# Patient Record
Sex: FEMALE | Race: WHITE | HISPANIC OR LATINO | URBAN - METROPOLITAN AREA
[De-identification: names, ages, dates, MRNs, and addresses within clinical notes are randomized per-mention and may not be internally consistent; named-entity substitution may affect disease eponyms.]

---

## 2020-06-02 ENCOUNTER — HOSPITAL ENCOUNTER (EMERGENCY)
Facility: HOSPITAL | Age: 35
Discharge: HOME | End: 2020-06-02
Attending: EMERGENCY MEDICINE

## 2020-06-02 VITALS
HEIGHT: 65 IN | RESPIRATION RATE: 18 BRPM | SYSTOLIC BLOOD PRESSURE: 114 MMHG | DIASTOLIC BLOOD PRESSURE: 67 MMHG | WEIGHT: 148 LBS | HEART RATE: 67 BPM | BODY MASS INDEX: 24.66 KG/M2 | OXYGEN SATURATION: 100 % | TEMPERATURE: 98 F

## 2020-06-02 DIAGNOSIS — S05.00XA CORNEAL ABRASION, UNSPECIFIED LATERALITY, INITIAL ENCOUNTER: Primary | ICD-10-CM

## 2020-06-02 DIAGNOSIS — H57.89 EYE IRRITATION: ICD-10-CM

## 2020-06-02 DIAGNOSIS — H10.33 ACUTE CONJUNCTIVITIS OF BOTH EYES, UNSPECIFIED ACUTE CONJUNCTIVITIS TYPE: ICD-10-CM

## 2020-06-02 LAB
B-HCG UR QL: NEGATIVE
CTP QC/QA: YES

## 2020-06-02 PROCEDURE — 99284 EMERGENCY DEPT VISIT MOD MDM: CPT | Mod: 25

## 2020-06-02 PROCEDURE — 96372 THER/PROPH/DIAG INJ SC/IM: CPT

## 2020-06-02 PROCEDURE — 63600175 PHARM REV CODE 636 W HCPCS: Performed by: EMERGENCY MEDICINE

## 2020-06-02 PROCEDURE — 25000003 PHARM REV CODE 250: Performed by: EMERGENCY MEDICINE

## 2020-06-02 PROCEDURE — 81025 URINE PREGNANCY TEST: CPT | Performed by: EMERGENCY MEDICINE

## 2020-06-02 RX ORDER — TETRACAINE HYDROCHLORIDE 5 MG/ML
2 SOLUTION OPHTHALMIC
Status: COMPLETED | OUTPATIENT
Start: 2020-06-02 | End: 2020-06-02

## 2020-06-02 RX ORDER — KETOROLAC TROMETHAMINE 30 MG/ML
10 INJECTION, SOLUTION INTRAMUSCULAR; INTRAVENOUS
Status: COMPLETED | OUTPATIENT
Start: 2020-06-02 | End: 2020-06-02

## 2020-06-02 RX ORDER — ERYTHROMYCIN 5 MG/G
OINTMENT OPHTHALMIC
Status: COMPLETED | OUTPATIENT
Start: 2020-06-02 | End: 2020-06-02

## 2020-06-02 RX ADMIN — KETOROLAC TROMETHAMINE 10 MG: 30 INJECTION, SOLUTION INTRAMUSCULAR at 12:06

## 2020-06-02 RX ADMIN — FLUORESCEIN SODIUM 1 EACH: 1 STRIP OPHTHALMIC at 10:06

## 2020-06-02 RX ADMIN — TETRACAINE HYDROCHLORIDE 2 DROP: 5 SOLUTION OPHTHALMIC at 10:06

## 2020-06-02 RX ADMIN — ERYTHROMYCIN 1 INCH: 5 OINTMENT OPHTHALMIC at 12:06

## 2020-06-02 NOTE — ED TRIAGE NOTES
Patient complains of eye problem. Speaks Swedish;  offered; MD speaks fluent Swedish. Observable redness to bilateral sclera of eyes. 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 Rinsed eyes with water and used OTC drops such as Clear Eye with no relief. Pt describes symptoms as pain and burning in both eyes.

## 2020-06-02 NOTE — ED PROVIDER NOTES
Encounter Date: 6/2/2020    SCRIBE #1 NOTE: I, Ramón Cisneros, am scribing for, and in the presence of,  Analilia Wise MD. I have scribed the following portions of the note - Other sections scribed: HPI, ROS.       History     Chief Complaint   Patient presents with    Eye Problem     bilateral red eye,states dusk got in eyes,no itching     CC: Eye Problem    HPI: This 34 y.o. Female with no pertinent past medical history presents to the emergency room for an evaluation of bilateral eye redness.  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  She feels that this irritated her eyes and she has been trying to treat with over-the-counter staff.  She rinsed out her eyes with copious amounts of water and then tried eye drops.  This has not made it better therefore she came to the ER to be evaluated.    She has no fevers chills nausea vomiting.  She does have pain in bilateral eyes with movement especially.  The light does bother her some therefore she wear sunglasses to make it feel better.  The discomfort in her eyes has given her headache in the front of her face.  She denies any respiratory or airway complaints.    The history is provided by the patient. No  was used.     Review of patient's allergies indicates:  No Known Allergies  No past medical history on file.  No past surgical history on file.  No family history on file.  Social History     Tobacco Use    Smoking status: Not on file   Substance Use Topics    Alcohol use: Not on file    Drug use: Not on file     Review of Systems   Constitutional: Negative for fever.   HENT: Negative for sore throat.    Eyes: Positive for photophobia and redness.   Respiratory: Negative for shortness of breath.    Cardiovascular: Negative for chest pain.   Gastrointestinal: Negative for nausea.   Genitourinary: Negative for dysuria.   Musculoskeletal: Negative for back pain.   Skin: Negative  for rash.   Neurological: Negative for weakness.   Hematological: Does not bruise/bleed easily.       Physical Exam     Initial Vitals [06/02/20 1016]   BP Pulse Resp Temp SpO2   114/67 67 18 97.9 °F (36.6 °C) 100 %      MAP       --         Physical Exam    Constitutional: She appears well-developed and well-nourished. No distress.   HENT:   Head: Normocephalic and atraumatic.   Eyes: EOM are normal. Pupils are equal, round, and reactive to light.   Conjunctiva are red bilaterally  Visual acuity was done bedside please see nursing note  Rex-Pen measurements were:  Right eye 16  Left eye 15    There is no cell and flare noted on slit-lamp exam    I do not notice any obvious large abrasions to the cornea   Neck: Normal range of motion. Neck supple.   Cardiovascular: Normal rate and normal heart sounds.   Pulmonary/Chest: Breath sounds normal. No respiratory distress.   Abdominal: Soft. She exhibits no distension. There is no tenderness.   Musculoskeletal: Normal range of motion. She exhibits no edema or tenderness.   Neurological: She is alert and oriented to person, place, and time.   Skin: Skin is warm and dry.   Psychiatric: She has a normal mood and affect.         ED Course   Procedures  Labs Reviewed   POCT URINE PREGNANCY          Imaging Results    None         [age] year old female with likely conjunctivitis from an irritant  Eye exam does not reveal any signs of glaucoma or abrasions  Erythromycin ordered and placed in ED. She should continue to use this every day until she sees an ophthalmologist.  I have explained in her language that she needs to follow up with an eye specialist and establish care with a PCP.  She has voiced understanding in her language.    I have offered a  to help with the conversation but as I am a native Welsh speaker she refused service because she feels comfortable speaking with me and feels that she understands me and I understand her.    CORY Wise MD  7:30  PM                      Scribe Attestation:   Scribe #1: I performed the above scribed service and the documentation accurately describes the services I performed. I attest to the accuracy of the note.                          Clinical Impression:       ICD-10-CM ICD-9-CM   1. Corneal abrasion, unspecified laterality, initial encounter S05.00XA 918.1   2. Eye irritation H57.89 379.99   3. Acute conjunctivitis of both eyes, unspecified acute conjunctivitis type H10.33 372.00             ED Disposition Condition    Discharge Stable        ED Prescriptions     None        Follow-up Information     Follow up With Specialties Details Why Contact Info Additional Information    [facility] [place]  Schedule an appointment as soon as possible for a visit in 3 days To establish care [address] [place] [address]  [phone]       [facility] Ophthalmology Schedule an appointment as soon as possible for a visit in 1 day For re evaluation [address] [facility] [id number] [phone] 1st Floor                      Scribe attestation: I, Analilia Wise, personally performed the services described in this documentation. All medical record entries made by the scribe were at my direction and in my presence.  I have reviewed the chart and agree that the record reflects my personal performance and is accurate and complete.               Analilia Wise MD  06/02/20 1930